# Patient Record
(demographics unavailable — no encounter records)

---

## 2024-10-10 NOTE — PHYSICAL EXAM
[Midline] : trachea located in midline position [Normal] : orientation to person, place, and time: normal [de-identified] : tc AS; A>B AD

## 2024-10-10 NOTE — DATA REVIEWED
[de-identified] : Left: Hearing WNL Right: Mild to moderate essentially CHL (mixed at 2kHz) Type A tymps AU Ipsi reflexes present in the left ear and absent in the right ear

## 2024-10-10 NOTE — HISTORY OF PRESENT ILLNESS
[de-identified] : 51 yo F with hx of Menieres (left ear), right otosclerosis and patulous ET presents for follow up per Dr. Chamorro. Last seen in March 2020. Remains off [place] and last vertigo attack was years ago. Gradual worsening of hearing AD - got hearing aid last month and its helpful. Has constant tinnitus AD which is masked with hearing aid. When working out, has aural fullness sensation in the right ear - then develops anxiety. No otalgia, otorrhea, ear infections, or headaches related to hearing loss. No new head trauma or exposure to loud noises since last visit. No new family hx of hearing loss. No recent imaging. No vertigo  - right ear fullness - no headaches - right Hearing Aids only -

## 2025-03-11 NOTE — HEALTH RISK ASSESSMENT
[Good] : ~his/her~  mood as  good [No falls in past year] : Patient reported no falls in the past year [0] : 2) Feeling down, depressed, or hopeless: Not at all (0) [[id number]] : 0 [Patient reported mammogram was normal] : Patient reported mammogram was normal [Patient reported colonoscopy was normal] : Patient reported colonoscopy was normal [MammogramDate] : 02/24 [ColonoscopyDate] : 2022